# Patient Record
Sex: MALE | Race: WHITE | Employment: FULL TIME | URBAN - METROPOLITAN AREA
[De-identification: names, ages, dates, MRNs, and addresses within clinical notes are randomized per-mention and may not be internally consistent; named-entity substitution may affect disease eponyms.]

---

## 2023-02-03 ENCOUNTER — HOSPITAL ENCOUNTER (EMERGENCY)
Age: 28
Discharge: HOME | End: 2023-02-03
Attending: STUDENT IN AN ORGANIZED HEALTH CARE EDUCATION/TRAINING PROGRAM

## 2023-02-03 VITALS
HEIGHT: 70 IN | SYSTOLIC BLOOD PRESSURE: 125 MMHG | DIASTOLIC BLOOD PRESSURE: 78 MMHG | TEMPERATURE: 97.9 F | WEIGHT: 165 LBS | RESPIRATION RATE: 16 BRPM | OXYGEN SATURATION: 99 % | BODY MASS INDEX: 23.62 KG/M2 | HEART RATE: 69 BPM

## 2023-02-03 DIAGNOSIS — K02.9 DENTAL CARIES: ICD-10-CM

## 2023-02-03 DIAGNOSIS — K08.89 PAIN, DENTAL: Primary | ICD-10-CM

## 2023-02-03 DIAGNOSIS — K04.7 DENTAL INFECTION: ICD-10-CM

## 2023-02-03 PROCEDURE — 6370000000 HC RX 637 (ALT 250 FOR IP): Performed by: STUDENT IN AN ORGANIZED HEALTH CARE EDUCATION/TRAINING PROGRAM

## 2023-02-03 PROCEDURE — 99283 EMERGENCY DEPT VISIT LOW MDM: CPT

## 2023-02-03 RX ORDER — AMOXICILLIN AND CLAVULANATE POTASSIUM 875; 125 MG/1; MG/1
1 TABLET, FILM COATED ORAL 2 TIMES DAILY
Qty: 14 TABLET | Refills: 0 | Status: SHIPPED | OUTPATIENT
Start: 2023-02-03 | End: 2023-02-10

## 2023-02-03 RX ORDER — AMOXICILLIN AND CLAVULANATE POTASSIUM 875; 125 MG/1; MG/1
1 TABLET, FILM COATED ORAL ONCE
Status: COMPLETED | OUTPATIENT
Start: 2023-02-03 | End: 2023-02-03

## 2023-02-03 RX ORDER — IBUPROFEN 800 MG/1
800 TABLET ORAL 2 TIMES DAILY PRN
Qty: 60 TABLET | Refills: 0 | Status: SHIPPED | OUTPATIENT
Start: 2023-02-03

## 2023-02-03 RX ADMIN — AMOXICILLIN AND CLAVULANATE POTASSIUM 1 TABLET: 875; 125 TABLET, FILM COATED ORAL at 22:04

## 2023-02-03 ASSESSMENT — PAIN SCALES - GENERAL: PAINLEVEL_OUTOF10: 8

## 2023-02-03 ASSESSMENT — PAIN DESCRIPTION - LOCATION: LOCATION: MOUTH

## 2023-02-03 ASSESSMENT — PAIN DESCRIPTION - PAIN TYPE: TYPE: ACUTE PAIN

## 2023-02-03 ASSESSMENT — PAIN - FUNCTIONAL ASSESSMENT: PAIN_FUNCTIONAL_ASSESSMENT: 0-10

## 2023-02-03 ASSESSMENT — PAIN DESCRIPTION - ORIENTATION: ORIENTATION: RIGHT

## 2023-02-03 ASSESSMENT — PAIN DESCRIPTION - FREQUENCY: FREQUENCY: CONTINUOUS

## 2023-02-03 NOTE — LETTER
[facility]  [address] [facility] [address]  Phone: [phone]               February 3, 2023    Patient: Molly Ramos   YOB: 1995   Date of Visit: 2/3/2023       To Whom It May Concern:    Molly Ramos was seen and treated in our emergency department on 2/3/2023. He may return to work on 2/5/2023.       Sincerely,       Binh Pérez RN         Signature:__________________________________ None known in lifetime

## 2023-02-04 NOTE — ED PROVIDER NOTES
Emergency Department Provider Note  Location: [place] [address] ED  2/3/2023     Patient Identification  Silas Simpson is a 33 y.o. male    Chief Complaint  Dental Pain (Rear right wisdom tooth pain x 1 month. )      Mode of Arrival  private car    HPI  (History provided by patient)  This is a 33 y.o. male without relevant past medical history presented today for dental pain. Symptoms have been ongoing for the last month. Reports increasing pain the past few days. Pain is right lower molar. Endorsing history of cavities. He has been taking ibuprofen now with no relief. Denies any fever, vomiting, chest pain, jaw swelling or redness, abdominal pain, shortness of breath, sore throat, ear pain, neck stiffness, or changes to bowel/bladder. He does not have dental follow up    ROS  Review of Systems   All other systems reviewed and are negative. I have reviewed the following nursing documentation:  Allergies: Not on File    Past medical history:  has no past medical history on file. Past surgical history:  has no past surgical history on file. Home medications:   Prior to Admission medications    Medication Sig Start Date End Date Taking? Authorizing Provider   amoxicillin-clavulanate (AUGMENTIN) 875-125 MG per tablet Take 1 tablet by mouth 2 times daily for 7 days 2/3/23 2/10/23 Yes Bianka Zhang MD   ibuprofen (ADVIL;MOTRIN) 800 MG tablet Take 1 tablet by mouth 2 times daily as needed for Pain 2/3/23  Yes Bianka Zhang MD       Social history:  reports that he has been smoking cigarettes. He does not have any smokeless tobacco history on file. He reports current alcohol use. Family history:  History reviewed. No pertinent family history.     Exam  ED Triage Vitals   BP Temp Temp src Heart Rate Resp SpO2 Height Weight   02/03/23 2136 02/03/23 2136 -- 02/03/23 2136 02/03/23 2136 02/03/23 2136 02/03/23 2135 02/03/23 2135   (!) 145/88 97.9 °F (36.6 °C)  65 17 99 % 5' 10\" (1.778 m) 165 lb (74.8 kg)     Physical Exam  Vitals and nursing note reviewed. Constitutional:       Appearance: Normal appearance. HENT:      Head: Normocephalic and atraumatic. Right Ear: External ear normal.      Left Ear: External ear normal.      Nose: Nose normal.      Mouth/Throat:      Pharynx: Oropharynx is clear. Comments: Dental carries present right lower molars, mild erythema, no abscess, no overlying jaw swelling or erythema  Eyes:      Extraocular Movements: Extraocular movements intact. Conjunctiva/sclera: Conjunctivae normal.   Cardiovascular:      Pulses: Normal pulses. Heart sounds: Normal heart sounds. Pulmonary:      [place]: Pulmonary effort is normal.      Breath sounds: Normal breath sounds. Musculoskeletal:      Cervical back: Normal range of motion and neck supple. Skin:     General: Skin is warm. Findings: No erythema. Neurological:      General: No focal deficit present. Mental Status: He is alert. Cranial Nerves: No cranial nerve deficit. Psychiatric:         Mood and Affect: Mood normal.         Behavior: Behavior normal.           MDM/ED Course  ED Medication Orders (From admission, onward)      Start Ordered     Status Ordering Provider    02/03/23 2200 02/03/23 2145  amoxicillin-clavulanate (AUGMENTIN) 875-125 MG per tablet 1 tablet  ONCE        Question:  Antimicrobial Indications  Answer:  Head and Neck Infection    Last MAR action: Given - by Suzie Alicia on 02/03/23 at [address] [place] [address]              Radiology  No results found. Labs  No results found for this visit on 02/03/23.      - Patient seen and evaluated in room lr E1.  33 y.o. male presented for right molar dental pain. He presented with normal vitals. Physical exam with TTP right lower molar, mild erythema. Presentation concerning for dental infection and carries. Again no evidence of abscess. Considered but doubt ludwigs angina as no chest pain or cellulitic changes.  Doubt peritonsillar abscess or deep space infection given my exam. No trismus or shortness of breath to suggest airway issue. No respiratory symptoms to suggest respiratory infection therefore I did not obtain labs or imaging   - Patient did not require telemetry monitoring  - Pertinent old records reviewed. - Patient was given oral augmentin in the ED. Upon reassessment, patient remained hemodynamically stable. - no PCP or dental follow up  - no chronic medical conditions  - No significant social stressors at this time  - I discussed the results with patient. We agreed to discharge home on oral augmentin. He was provided dental clinic information    - Return precautions also discussed. patient verbalized understanding of care plan and agreed to follow-up with PCP and dental clinic as advised. Clinical Impression:  1. Pain, dental    2. Dental caries    3. Dental infection          Disposition:  Discharge to home in good condition. Blood pressure 125/78, pulse 69, temperature 97.9 °F (36.6 °C), resp. rate 16, height 5' 10\" (1.778 m), weight 165 lb (74.8 kg), SpO2 99 %. Patient was given scripts for the following medications. I counseled patient how to take these medications. Discharge Medication List as of 2/3/2023 10:10 PM        START taking these medications    Details   amoxicillin-clavulanate (AUGMENTIN) 875-125 MG per tablet Take 1 tablet by mouth 2 times daily for 7 days, Disp-14 tablet, R-0Print      ibuprofen (ADVIL;MOTRIN) 800 MG tablet Take 1 tablet by mouth 2 times daily as needed for Pain, Disp-60 tablet, R-0Print             Disposition referral (if applicable):  No follow-up provider specified. This chart was generated in part by using Dragon Dictation system and may contain errors related to that system including errors in grammar, punctuation, and spelling, as well as words and phrases that may be inappropriate.  If there are any questions or concerns please feel free to contact the dictating provider for clarification.      Lino Hairston MD  [address] [place] [address]        Lino Hairston MD  02/04/23 0000

## 2023-02-04 NOTE — DISCHARGE INSTRUCTIONS
Dental Emergency Referrals    [address] [facility] residents only)    [facility] AT [place]  [address] [phone]   [facility].  [phone]   [address] [facility]  [address]  [phone]   [facility] AT [place]  (entrance on [address]. [address] [place] [address].)  [address]  [phone]   [facility] 167.  [phone]   [facility]  [address] [place] [address].  [phone] [address]  [phone]   [facility]  [address].  [phone]     [facility] MEDICAL [place]  [address] [facility] 2nd floor  [phone]   [facility]  [address] [phone] ([address] [place] [address])  [place]: [address]: [address]   [phone]   [address] [facility] 128  [facility]  [phone]     [facility]  [address]  [phone]   [address]  [address] [facility].  Oral Surgery Dept: [phone]  Dental Clinic: [address] [facility]  [phone]     [address] [phone]   [facility]   [address] [place], [address]  [place] : [address] : [phone]     [facility]  [address] [facility] [address] [address]    Other [address] in the area    [address] [facility] (Dental Urgent Care)  [facility]  (Across from [address])  [facility], [address]  [phone]?       Pediatric Only Dentists    [address]Third Floor   Up to age 21  [address]  Up to age 24  [place]: [facility]   [phone] or [address] [facility] [address]: [address] [place] [address]  Up to age 14  46 [employer] [phone]